# Patient Record
Sex: MALE | Race: WHITE | URBAN - METROPOLITAN AREA
[De-identification: names, ages, dates, MRNs, and addresses within clinical notes are randomized per-mention and may not be internally consistent; named-entity substitution may affect disease eponyms.]

---

## 2017-08-08 ENCOUNTER — OFFICE VISIT (OUTPATIENT)
Dept: INTERNAL MEDICINE | Facility: CLINIC | Age: 42
End: 2017-08-08
Payer: COMMERCIAL

## 2017-08-08 VITALS
WEIGHT: 291 LBS | TEMPERATURE: 98.5 F | OXYGEN SATURATION: 98 % | SYSTOLIC BLOOD PRESSURE: 124 MMHG | HEART RATE: 99 BPM | BODY MASS INDEX: 39.47 KG/M2 | DIASTOLIC BLOOD PRESSURE: 76 MMHG

## 2017-08-08 DIAGNOSIS — E66.9 OBESITY (BMI 30-39.9): ICD-10-CM

## 2017-08-08 DIAGNOSIS — L03.115 CELLULITIS OF RIGHT LOWER EXTREMITY: Primary | ICD-10-CM

## 2017-08-08 PROCEDURE — 99214 OFFICE O/P EST MOD 30 MIN: CPT | Performed by: INTERNAL MEDICINE

## 2017-08-08 RX ORDER — CEPHALEXIN 500 MG/1
500 CAPSULE ORAL 4 TIMES DAILY
Qty: 40 CAPSULE | Refills: 0 | Status: SHIPPED | OUTPATIENT
Start: 2017-08-08 | End: 2017-08-18

## 2017-08-08 NOTE — PATIENT INSTRUCTIONS
"*  Lower extremity cellulitis, infection of the skin.     *  Antibiotic:  Cephalexin 500 mg four times per day for next 10 days.     *  Motrin as needed for pain.     *  If the symptoms get significant worse, like worsening pain, fevers, or the redness extends significantly beyond the borders of the markings I drew around the redness despite antibiotics, then go to the ER as you may need IV antibiotics.        *  OBESITY/WEIGHT LOSS:  ====================================================    *  Obesity is a major problem in this country.  Over 2/3 of adult Americans are overweight (BMI Over 25), and 1/3 are obese (BMI over 30)    *  Obesity is the leading cause of diabetes type II, which currently affects 1 out of 10 adult Americans and is projected to potentially affect 1 out of 3 adult Americans by 2040    *  Chronic obesity leads to \"insulin resistance\" which affects the carbohydrate (sugar) metabolism causing \"diabetisy\" which leads to type II Diabetes, increased visceral fat, a chronic low grade inflammatory state that leads to higher rates of vascular disease among other things.     *  Obesity is defined as BMI (body mass index) greater than 30.    *  Morbid obesity is defined as BMI over 40    Your most recent Body mass index is:  Body mass index is 39.47 kg/(m^2).    The main principles in weight loss are diet and exercise. You need to have both.      Dietary principles are aimed at keeping the fat content in your diet to less than 30% of total calories AND minimizing the simple carbohydrates (breads, sugars, pasta, etc.).  Complex carbohydrates such as wild rice, brown rice, legumes and most vegetables are OK.    *  Think \"Eat like a caveman\", eat \"primitive\" foods.    *  Keep your food shopping to the outside of the grocery store, do not eat foods that come from a bag or box, do not eat foods with bar codes.    *  Use the fork rule for all eating. [If you can't pick food up with a fork, then the food will " "not turn off hunger very well. Using this rule helps patients avoid choosing the wrong types of food, especially if you have had a bariatric surgery operation.   The \"wrong foods\" include liquid calories such as soup, juice, soda, slimfast, ice cream, and beer, crumbly foods such as chips, crackers, and cookies, and starch based foods such as pasta, too much rice, and too much bread.]     * Keep your calorie intake at least less than 1500 per day to start (under 1300 total if you want to be more aggressive), divided between 3 meals (try to have no meal more than 500 calories) and 2 snacks.      *  \"Learn what 500 calories looks like\" and try to keep all meals under 500 calories.      *  Drink one large glass of water BEFORE each meal.  This not only helps guard against dehydration, but it also helps provide additional \"fullness\" that will help reduce the amount of food that you will consume in a given meal by helping you fill up earlier.      *  Figure out what kind of calories you are taking in now at this time.  It is hard to change behaviors that you do not understand.      *  Eat breakfast every day.  Skipping meals has been shown to be one of the factors that correlates the highest with obesity, (even more than fast food).  Try to avoid the typical carbohydrates and sugars that dominate the typical American breakfast.  Try to get more protein in earlier in the day, this will make you less hungry later.       *  Try High Protein Ensure or Boost nutritional supplements (or similar versions) for breakfast if nothing else.      *  Avoid \"manufactured foods\" as much as possible.  My definition of a \"manufactured\" or \"processed\" food is any single food product that has more than 6 ingredients.     *  Keep your grocery shopping to the \"outside\" of the grocery store, this is where all the \"real food\" is located.      *  Try to limit all simple carbohydrate foods such as white breads (whole grain natural breads are OK), " "white rice (brown rice and wild rice are OK), pasta, noodles, \"snack foods\", candy, jam/jellies, cakes, pies, sweets, regular soda (sugar free is OK).    *  Limit the amount of citrus fruits such as oranges, rishi, grapefruit.  These contain a large amount of sugars and will raise your blood sugars very high.  Try to keep less than 3 pieces of fruit per day.  Grapefruits specifically can interfere with the metabolism of \"statin\" cholesterol lowering drugs and therefore should be avoided completely if you are on a statin medication.      *  Always eat three meals a day every day:  breakfast, lunch, and supper.    *  I do not recommend over the counter diet aids such as Metabolife or Dexatrim since they have a high risk of side effects mainly fast heart rate, insomnia, and nervousness.    *  Very hard to lose weight with diet changes alone.  You need to have regular exercise.  You should aim for either 3 hours per week total of cumulative physical aerobic activity or 10,000 steps per day of activity.  Buy a pedometer and keep track of your activity.  If your typical daily activity does not add up to 10,000 total steps, then make up the difference with walking or some sort of aerobic activity.          Good resources for nutrition are:         --Good Grains:  Oats, brown rice, Quinoa (these do not raise the blood sugar as much)     --Bad grains:  Anything made from wheat or white rice     (because these raise the blood sugars significantly, and the possible gluten issue from wheat for some people).      --Proteins:  Aim for \"lean proteins\" including chicken, fish, seafood, pork, turkey, and eggs (in moderation); Eat red meat only occasionally      ---> \"Wheat Belly\" by Luis Teran  (a book about the many bad things processed wheat products (i.e. Bread) have caused in our country...which I believe has serious merit)       --->  \"The Paleo Diet\"  By Odette Valentine.             --->\"In Defense of Food\"  Of \"Food Rules\" " "(Nilo Loja) a book that goes into the causes obesity epidemic in our country    Jos Loja:                    ---> \"Picture Perfect Weight Loss\" by Ignacio Estrada (another good book about choices)        ---> \"Eat This, Not That\" Series,  by Mitul Guerrero  (a book about food choices in the modern world)              ---> \"The Blood Sugar Solution\" by Zachery Avila ( a book about obesity and insulin resistance leading to \"diabesity\")           Isidro Garrett ( a guidebook for counting calories, and knowing the components of the food that you eat)       \"The Best Life Diet\"  (a complete diet program)             Aim for a Healthy Weight Web Page at www.nhlbi.nih.gov       [place] Diet       Sudhakar Hawkins:  \"Eat More, Weigh Less\" or \"The Program for the Reversal of Heart Disease\"       [facility] web site  the food and nutrition link.       American Heart Association       American Diabetes Association (www.diabetes.org)            "

## 2017-08-08 NOTE — MR AVS SNAPSHOT
"              After Visit Summary   8/8/2017    Tim Burns    MRN: [id number]           Patient Information     Date Of Birth          1975        Visit Information        Provider Department      8/8/2017 1:20 PM Miguel Garcia MD [facility]        Today's Diagnoses     Cellulitis of right lower extremity    -  1    Obesity (BMI 30-39.9)          Care Instructions    *  Lower extremity cellulitis, infection of the skin.     *  Antibiotic:  Cephalexin 500 mg four times per day for next 10 days.     *  Motrin as needed for pain.     *  If the symptoms get significant worse, like worsening pain, fevers, or the redness extends significantly beyond the borders of the markings I drew around the redness despite antibiotics, then go to the ER as you may need IV antibiotics.        *  OBESITY/WEIGHT LOSS:  ====================================================    *  Obesity is a major problem in this country.  Over 2/3 of adult Americans are overweight (BMI Over 25), and 1/3 are obese (BMI over 30)    *  Obesity is the leading cause of diabetes type II, which currently affects 1 out of 10 adult Americans and is projected to potentially affect 1 out of 3 adult Americans by 2040    *  Chronic obesity leads to \"insulin resistance\" which affects the carbohydrate (sugar) metabolism causing \"diabetisy\" which leads to type II Diabetes, increased visceral fat, a chronic low grade inflammatory state that leads to higher rates of vascular disease among other things.     *  Obesity is defined as BMI (body mass index) greater than 30.    *  Morbid obesity is defined as BMI over 40    Your most recent Body mass index is:  Body mass index is 39.47 kg/(m^2).    The main principles in weight loss are diet and exercise. You need to have both.      Dietary principles are aimed at keeping the fat content in your diet to less than 30% of total calories AND minimizing the simple carbohydrates (breads, " "sugars, pasta, etc.).  Complex carbohydrates such as wild rice, brown rice, legumes and most vegetables are OK.    *  Think \"Eat like a caveman\", eat \"primitive\" foods.    *  Keep your food shopping to the outside of the grocery store, do not eat foods that come from a bag or box, do not eat foods with bar codes.    *  Use the fork rule for all eating. [If you can't pick food up with a fork, then the food will not turn off hunger very well. Using this rule helps patients avoid choosing the wrong types of food, especially if you have had a bariatric surgery operation.   The \"wrong foods\" include liquid calories such as soup, juice, soda, slimfast, ice cream, and beer, crumbly foods such as chips, crackers, and cookies, and starch based foods such as pasta, too much rice, and too much bread.]     * Keep your calorie intake at least less than 1500 per day to start (under 1300 total if you want to be more aggressive), divided between 3 meals (try to have no meal more than 500 calories) and 2 snacks.      *  \"Learn what 500 calories looks like\" and try to keep all meals under 500 calories.      *  Drink one large glass of water BEFORE each meal.  This not only helps guard against dehydration, but it also helps provide additional \"fullness\" that will help reduce the amount of food that you will consume in a given meal by helping you fill up earlier.      *  Figure out what kind of calories you are taking in now at this time.  It is hard to change behaviors that you do not understand.      *  Eat breakfast every day.  Skipping meals has been shown to be one of the factors that correlates the highest with obesity, (even more than fast food).  Try to avoid the typical carbohydrates and sugars that dominate the typical American breakfast.  Try to get more protein in earlier in the day, this will make you less hungry later.       *  Try High Protein Ensure or Boost nutritional supplements (or similar versions) for breakfast if " "nothing else.      *  Avoid \"manufactured foods\" as much as possible.  My definition of a \"manufactured\" or \"processed\" food is any single food product that has more than 6 ingredients.     *  Keep your grocery shopping to the \"outside\" of the grocery store, this is where all the \"real food\" is located.      *  Try to limit all simple carbohydrate foods such as white breads (whole grain natural breads are OK), white rice (brown rice and wild rice are OK), pasta, noodles, \"snack foods\", candy, jam/jellies, cakes, pies, sweets, regular soda (sugar free is OK).    *  Limit the amount of citrus fruits such as oranges, rishi, grapefruit.  These contain a large amount of sugars and will raise your blood sugars very high.  Try to keep less than 3 pieces of fruit per day.  Grapefruits specifically can interfere with the metabolism of \"statin\" cholesterol lowering drugs and therefore should be avoided completely if you are on a statin medication.      *  Always eat three meals a day every day:  breakfast, lunch, and supper.    *  I do not recommend over the counter diet aids such as Metabolife or Dexatrim since they have a high risk of side effects mainly fast heart rate, insomnia, and nervousness.    *  Very hard to lose weight with diet changes alone.  You need to have regular exercise.  You should aim for either 3 hours per week total of cumulative physical aerobic activity or 10,000 steps per day of activity.  Buy a pedometer and keep track of your activity.  If your typical daily activity does not add up to 10,000 total steps, then make up the difference with walking or some sort of aerobic activity.          Good resources for nutrition are:         --Good Grains:  Oats, brown rice, Quinoa (these do not raise the blood sugar as much)     --Bad grains:  Anything made from wheat or white rice     (because these raise the blood sugars significantly, and the possible gluten issue from wheat for some people). " "     --Proteins:  Aim for \"lean proteins\" including chicken, fish, seafood, pork, turkey, and eggs (in moderation); Eat red meat only occasionally      ---> \"Wheat Belly\" by Luis Teran  (a book about the many bad things processed wheat products (i.e. Bread) have caused in our country...which I believe has serious merit)       --->  \"The Paleo Diet\"  By Odette Valentine.             --->\"In Defense of Food\"  Of \"Food Rules\" (Nilo Loja) a book that goes into the causes obesity epidemic in our country    Jos Loja:                    ---> \"Picture Perfect Weight Loss\" by Ignacio Estrada (another good book about choices)        ---> \"Eat This, Not That\" Series,  by Mitul Guerrero  (a book about food choices in the modern world)              ---> \"The Blood Sugar Solution\" by Zachery Avila ( a book about obesity and insulin resistance leading to \"diabesity\")           Isidro Tucker ( a guidebook for counting calories, and knowing the components of the food that you eat)       \"The Best Life Diet\"  (a complete diet program)             Aim for a Healthy Weight Web Page at www.nhlbi.nih.gov       [place] Diet       Sudhakar Hawkins:  \"Eat More, Weigh Less\" or \"The Program for the Reversal of Heart Disease\"       [facility] web site  the food and nutrition link.       American Heart Association       American Diabetes Association (www.diabetes.org)                    Follow-ups after your visit        Who to contact     If you have questions or need follow up information about today's clinic visit or your schedule please contact [facility] directly at [phone].  Normal or non-critical lab and imaging results will be communicated to you by MyChart, letter or phone within 4 business days after the clinic has received the results. If you do not hear from us within 7 days, please contact the clinic through MyChart or phone. If you have a critical or abnormal lab result, we will notify you by " phone as soon as possible.  Submit refill requests through [employer] or call your pharmacy and they will forward the refill request to us. Please allow 3 business days for your refill to be completed.          Additional Information About Your Visit        [employer] Information     [employer] gives you secure access to your electronic health record. If you see a primary care provider, you can also send messages to your care team and make appointments. If you have questions, please call your primary care clinic.  If you do not have a primary care provider, please call [phone] and they will assist you.        Care EveryWhere ID     This is your Care EveryWhere ID. This could be used by other organizations to access your [place] medical records  [id number]        Your Vitals Were     Pulse Temperature Pulse Oximetry BMI (Body Mass Index)          99 98.5  F (36.9  C) (Oral) 98% 39.47 kg/m2         Blood Pressure from Last 3 Encounters:   08/08/17 124/76   05/27/12 120/72   01/19/12 108/66    Weight from Last 3 Encounters:   08/08/17 291 lb (132 kg)   05/27/12 280 lb (127 kg)   01/19/12 278 lb (126.1 kg)              Today, you had the following     No orders found for display         Today's Medication Changes          These changes are accurate as of: 8/8/17  2:04 PM.  If you have any questions, ask your nurse or doctor.               Start taking these medicines.        Dose/Directions    cephALEXin 500 MG capsule   Commonly known as:  KEFLEX   Used for:  Cellulitis of right lower extremity   Started by:  Miguel Garcia MD        Dose:  500 mg   Take 1 capsule (500 mg) by mouth 4 times daily for 10 days   Quantity:  40 capsule   Refills:  0            Where to get your medicines      These medications were sent to [facility]/pharmacy #[id number] - [place], [place] - [address]  [address] [address]     Phone:  [phone]     cephALEXin 500 MG capsule                Primary Care Provider  Office Phone # Fax #    Zachery Austin [phone]       [facility] [address] [facility] [address]        Equal Access to Services     ADRIANA BURNS : Sarah jose ku hadmalouo Soomaali, waaxda luqadaha, qaybta kaalmada adeegyada, waxaileen dunnen jhonny munroe david ritchie. So [facility] [place] [phone].    ATENCIÓN: Si habla español, tiene a mathew disposición servicios gratuitos de asistencia lingüística. Llame al [phone].    We comply with applicable federal civil rights laws and [place] laws. We do not discriminate on the basis of race, color, national origin, age, disability sex, sexual orientation or gender identity.            Thank you!     Thank you for choosing [facility]  for your care. Our goal is always to provide you with excellent care. Hearing back from our patients is one way we can continue to improve our services. Please take a few minutes to complete the written survey that you may receive in the mail after your visit with us. Thank you!             Your Updated Medication List - Protect others around you: Learn how to safely use, store and throw away your medicines at www.disposemymeds.org.          This list is accurate as of: 8/8/17  2:04 PM.  Always use your most recent med list.                   Brand Name Dispense Instructions for use Diagnosis    cephALEXin 500 MG capsule    KEFLEX    40 capsule    Take 1 capsule (500 mg) by mouth 4 times daily for 10 days    Cellulitis of right lower extremity

## 2017-08-08 NOTE — NURSING NOTE
Chief Complaint   Patient presents with     Edema     in R lower leg X 4 days, red and painfull       Initial /76  Pulse 99  Temp 98.5  F (36.9  C) (Oral)  Wt 291 lb (132 kg)  SpO2 98%  BMI 39.47 kg/m2 Estimated body mass index is 39.47 kg/(m^2) as calculated from the following:    Height as of 5/27/12: 6' (1.829 m).    Weight as of this encounter: 291 lb (132 kg).  Medication Reconciliation: complete   Jessie Lopes, CMA

## 2018-12-21 ENCOUNTER — OFFICE VISIT (OUTPATIENT)
Dept: INTERNAL MEDICINE | Facility: CLINIC | Age: 43
End: 2018-12-21
Payer: COMMERCIAL

## 2018-12-21 VITALS
SYSTOLIC BLOOD PRESSURE: 148 MMHG | OXYGEN SATURATION: 96 % | HEIGHT: 73 IN | RESPIRATION RATE: 16 BRPM | DIASTOLIC BLOOD PRESSURE: 80 MMHG | WEIGHT: 312 LBS | HEART RATE: 96 BPM | BODY MASS INDEX: 41.35 KG/M2 | TEMPERATURE: 97.7 F

## 2018-12-21 DIAGNOSIS — E66.01 MORBID OBESITY (H): ICD-10-CM

## 2018-12-21 DIAGNOSIS — J20.9 ACUTE BRONCHITIS WITH SYMPTOMS > 10 DAYS: Primary | ICD-10-CM

## 2018-12-21 DIAGNOSIS — R03.0 ELEVATED BP WITHOUT DIAGNOSIS OF HYPERTENSION: ICD-10-CM

## 2018-12-21 PROCEDURE — 99214 OFFICE O/P EST MOD 30 MIN: CPT | Performed by: INTERNAL MEDICINE

## 2018-12-21 RX ORDER — BENZONATATE 100 MG/1
CAPSULE ORAL
Refills: 0 | COMMUNITY
Start: 2018-12-06

## 2018-12-21 RX ORDER — ALBUTEROL SULFATE 90 UG/1
AEROSOL, METERED RESPIRATORY (INHALATION)
Refills: 0 | COMMUNITY
Start: 2018-12-06

## 2018-12-21 RX ORDER — PREDNISONE 20 MG/1
20 TABLET ORAL DAILY
Qty: 5 TABLET | Refills: 0 | Status: SHIPPED | OUTPATIENT
Start: 2018-12-21 | End: 2018-12-26

## 2018-12-21 RX ORDER — AZITHROMYCIN 250 MG/1
TABLET, FILM COATED ORAL
Qty: 6 TABLET | Refills: 0 | Status: SHIPPED | OUTPATIENT
Start: 2018-12-21 | End: 2018-12-26

## 2018-12-21 ASSESSMENT — MIFFLIN-ST. JEOR: SCORE: 2374.1

## 2018-12-21 NOTE — PROGRESS NOTES
"  SUBJECTIVE:   iTm Burns is a 43 year old male who presents to clinic today for the following health issues:      Acute Illness   Acute illness concerns: COUGH,CONGESTION   Onset: 4 weeks    Fever: no    Chills/Sweats: no    Headache (location?): YES    Sinus Pressure:YES    Conjunctivitis:  no    Ear Pain: YES: both    Rhinorrhea: no    Congestion: YES    Sore Throat: YES     Cough: YES-non-productive    Wheeze: no    Decreased Appetite: no    Nausea: no    Vomiting: no    Diarrhea:  no    Dysuria/Freq.: no    Fatigue/Achiness: YES    Sick/Strep Exposure: no     Therapies Tried and outcome: Benzonate and inhaler    Seen at [facility] early December for similar symptoms.  At that time placed on an albuterol inhaler which she states is used on and off without significant improvement.    Problem list and histories reviewed & adjusted, as indicated.  Additional history: as documented    Labs reviewed in EPIC    Reviewed and updated as needed this visit by clinical staff  Allergies  Meds       Reviewed and updated as needed this visit by Provider         ROS:  Constitutional, HEENT, cardiovascular, pulmonary, gi and gu systems are negative, except as otherwise noted.    OBJECTIVE:                                                    /80   Pulse 96   Temp 97.7  F (36.5  C) (Oral)   Resp 16   Ht 1.854 m (6' 1\")   Wt 141.5 kg (312 lb)   SpO2 96%   BMI 41.16 kg/m    Body mass index is 41.16 kg/m .  GENERAL APPEARANCE: alert, no distress and over weight  HENT: nose and mouth without ulcers or lesions, nasal mucosa edematous without rhinorrhea, oral mucous membranes moist, oropharynx clear and normal cephalic/atraumatic  NECK: no adenopathy, no asymmetry, masses, or scars and thyroid normal to palpation  RESP: Good breath sounds noted bilaterally.  There is wheezing on the right greater than the left lung  CV: regular rates and rhythm, normal S1 S2, no S3 or S4 and no murmur, click or rub    Diagnostic " test results:  none      ASSESSMENT/PLAN:                                                    1. Acute bronchitis with symptoms > 10 days  Treatment based mostly on duration of symptoms as well as intensity of his coughing and presence of wheezing  - azithromycin (ZITHROMAX) 250 MG tablet; Take 2 tablets (500 mg) by mouth daily for 1 day, THEN 1 tablet (250 mg) daily for 4 days.  Dispense: 6 tablet; Refill: 0  - predniSONE (DELTASONE) 20 MG tablet; Take 20 mg by mouth daily for 5 days.  Dispense: 5 tablet; Refill: 0    2. Morbid obesity (H)  Weight loss    3. Elevated BP without diagnosis of hypertension  New-may be related to acute illness but requires further evaluation  Patient to follow-up in the next month recheck consider treatment if continued elevation      Robert Gonsalez MD  [facility]

## 2020-03-01 ENCOUNTER — HEALTH MAINTENANCE LETTER (OUTPATIENT)
Age: 45
End: 2020-03-01